# Patient Record
Sex: MALE | Race: ASIAN | NOT HISPANIC OR LATINO | Employment: PART TIME | URBAN - METROPOLITAN AREA
[De-identification: names, ages, dates, MRNs, and addresses within clinical notes are randomized per-mention and may not be internally consistent; named-entity substitution may affect disease eponyms.]

---

## 2022-07-07 ENCOUNTER — HOSPITAL ENCOUNTER (EMERGENCY)
Facility: CLINIC | Age: 27
Discharge: HOME | End: 2022-07-07
Attending: EMERGENCY MEDICINE | Admitting: EMERGENCY MEDICINE
Payer: COMMERCIAL

## 2022-07-07 VITALS
TEMPERATURE: 99.2 F | HEIGHT: 66 IN | WEIGHT: 140 LBS | SYSTOLIC BLOOD PRESSURE: 101 MMHG | RESPIRATION RATE: 16 BRPM | BODY MASS INDEX: 22.5 KG/M2 | HEART RATE: 94 BPM | OXYGEN SATURATION: 100 % | DIASTOLIC BLOOD PRESSURE: 62 MMHG

## 2022-07-07 DIAGNOSIS — K92.1 MELENA: ICD-10-CM

## 2022-07-07 DIAGNOSIS — D50.0 NORMOCYTIC ANEMIA DUE TO BLOOD LOSS: ICD-10-CM

## 2022-07-07 DIAGNOSIS — K92.2 UPPER GI BLEED: ICD-10-CM

## 2022-07-07 LAB
ALBUMIN SERPL-MCNC: 2.9 G/DL (ref 3.4–5)
ALP SERPL-CCNC: 40 U/L (ref 40–150)
ALT SERPL W P-5'-P-CCNC: 12 U/L (ref 0–70)
ANION GAP SERPL CALCULATED.3IONS-SCNC: 7 MMOL/L (ref 3–14)
AST SERPL W P-5'-P-CCNC: 16 U/L (ref 0–45)
BASOPHILS # BLD AUTO: 0 10E3/UL (ref 0–0.2)
BASOPHILS NFR BLD AUTO: 0 %
BILIRUB SERPL-MCNC: 0.2 MG/DL (ref 0.2–1.3)
BUN SERPL-MCNC: 22 MG/DL (ref 7–30)
CALCIUM SERPL-MCNC: 7.8 MG/DL (ref 8.5–10.1)
CHLORIDE BLD-SCNC: 109 MMOL/L (ref 94–109)
CO2 SERPL-SCNC: 21 MMOL/L (ref 20–32)
CREAT SERPL-MCNC: 0.49 MG/DL (ref 0.66–1.25)
EOSINOPHIL # BLD AUTO: 0.5 10E3/UL (ref 0–0.7)
EOSINOPHIL NFR BLD AUTO: 4 %
ERYTHROCYTE [DISTWIDTH] IN BLOOD BY AUTOMATED COUNT: 13.6 % (ref 10–15)
GFR SERPL CREATININE-BSD FRML MDRD: >90 ML/MIN/1.73M2
GLUCOSE BLD-MCNC: 89 MG/DL (ref 70–99)
HCT VFR BLD AUTO: 29.4 % (ref 40–53)
HEMOCCULT STL QL: POSITIVE
HGB BLD-MCNC: 9 G/DL (ref 13.3–17.7)
HOLD SPECIMEN: NORMAL
IMM GRANULOCYTES # BLD: 0.1 10E3/UL
IMM GRANULOCYTES NFR BLD: 1 %
INR PPP: 1 (ref 0.85–1.15)
LYMPHOCYTES # BLD AUTO: 2.4 10E3/UL (ref 0.8–5.3)
LYMPHOCYTES NFR BLD AUTO: 22 %
MCH RBC QN AUTO: 27.6 PG (ref 26.5–33)
MCHC RBC AUTO-ENTMCNC: 30.6 G/DL (ref 31.5–36.5)
MCV RBC AUTO: 90 FL (ref 78–100)
MONOCYTES # BLD AUTO: 0.7 10E3/UL (ref 0–1.3)
MONOCYTES NFR BLD AUTO: 7 %
NEUTROPHILS # BLD AUTO: 7.3 10E3/UL (ref 1.6–8.3)
NEUTROPHILS NFR BLD AUTO: 66 %
NRBC # BLD AUTO: 0 10E3/UL
NRBC BLD AUTO-RTO: 0 /100
PLATELET # BLD AUTO: 212 10E3/UL (ref 150–450)
POTASSIUM BLD-SCNC: 4 MMOL/L (ref 3.4–5.3)
PROT SERPL-MCNC: 6 G/DL (ref 6.8–8.8)
RBC # BLD AUTO: 3.26 10E6/UL (ref 4.4–5.9)
SODIUM SERPL-SCNC: 137 MMOL/L (ref 133–144)
WBC # BLD AUTO: 11 10E3/UL (ref 4–11)

## 2022-07-07 PROCEDURE — 96374 THER/PROPH/DIAG INJ IV PUSH: CPT

## 2022-07-07 PROCEDURE — C9113 INJ PANTOPRAZOLE SODIUM, VIA: HCPCS | Performed by: EMERGENCY MEDICINE

## 2022-07-07 PROCEDURE — 99284 EMERGENCY DEPT VISIT MOD MDM: CPT | Mod: 25

## 2022-07-07 PROCEDURE — 258N000003 HC RX IP 258 OP 636: Performed by: EMERGENCY MEDICINE

## 2022-07-07 PROCEDURE — 36415 COLL VENOUS BLD VENIPUNCTURE: CPT | Performed by: EMERGENCY MEDICINE

## 2022-07-07 PROCEDURE — 96361 HYDRATE IV INFUSION ADD-ON: CPT

## 2022-07-07 PROCEDURE — 82272 OCCULT BLD FECES 1-3 TESTS: CPT | Performed by: EMERGENCY MEDICINE

## 2022-07-07 PROCEDURE — 250N000011 HC RX IP 250 OP 636: Performed by: EMERGENCY MEDICINE

## 2022-07-07 PROCEDURE — 85610 PROTHROMBIN TIME: CPT | Performed by: EMERGENCY MEDICINE

## 2022-07-07 PROCEDURE — 85004 AUTOMATED DIFF WBC COUNT: CPT | Performed by: EMERGENCY MEDICINE

## 2022-07-07 PROCEDURE — 82310 ASSAY OF CALCIUM: CPT | Performed by: EMERGENCY MEDICINE

## 2022-07-07 PROCEDURE — 250N000013 HC RX MED GY IP 250 OP 250 PS 637: Performed by: EMERGENCY MEDICINE

## 2022-07-07 RX ORDER — ACETAMINOPHEN 500 MG
1000 TABLET ORAL ONCE
Status: COMPLETED | OUTPATIENT
Start: 2022-07-07 | End: 2022-07-07

## 2022-07-07 RX ORDER — PANTOPRAZOLE SODIUM 40 MG/1
40 TABLET, DELAYED RELEASE ORAL DAILY
Qty: 30 TABLET | Refills: 0 | Status: ON HOLD | OUTPATIENT
Start: 2022-07-07 | End: 2022-07-10

## 2022-07-07 RX ADMIN — PANTOPRAZOLE SODIUM 40 MG: 40 INJECTION, POWDER, FOR SOLUTION INTRAVENOUS at 15:09

## 2022-07-07 RX ADMIN — ACETAMINOPHEN 1000 MG: 500 TABLET, FILM COATED ORAL at 15:10

## 2022-07-07 RX ADMIN — SODIUM CHLORIDE 1000 ML: 9 INJECTION, SOLUTION INTRAVENOUS at 15:10

## 2022-07-07 ASSESSMENT — ENCOUNTER SYMPTOMS
ABDOMINAL PAIN: 0
APPETITE CHANGE: 0
ROS GI COMMENTS: + BLACK STOOL
HEADACHES: 1

## 2022-07-07 NOTE — ED PROVIDER NOTES
History   Chief Complaint:  Black stools       The history is provided by the patient.      Oh Townsend is a healthy 26 year old male who presents with black stools. He reports 2-3 days of black stools, including an episode this morning, which he read about online and became concerned. He visited Urgent Care this morning and was referred to the ER. He denies hematochezia or current abdominal pain with a single episode of epigastric pain last night lasting 1/2 hour and resolving after eating a banana. He does have mild headache but no lightheadedness or dyspnea. He denies prior history of gastrointestinal bleeds, endoscopy, excessive use of NSAIDs, tobacco use, or more than occasional alcohol use. He denies recent diet changes, iron supplements, or Pepto Bismol use.     Of note, review of medical records indicates he was seen in December 2019 for dark stools and recommended to follow up with gastroenterology. He never did this and his stools normalized after a couple weeks.    Review of Systems   Constitutional: Negative for appetite change.   Respiratory: Negative for shortness of breath.    Gastrointestinal: Negative for abdominal pain and blood in stool.        + black stool   Neurological: Positive for headaches. Negative for light-headedness.   All other systems reviewed and are negative.    Allergies:  The patient has no known allergies.     Medications:  The patient denies taking prescribed medications.   Does not regularly use NSAIDs.    Past Medical History:     Anal condyloma    Surgical History:  Excision condyloma    Social History:  The patient presents to the ED alone.  Alcohol Use: occasional  Denies tobacco use.    Physical Exam     Patient Vitals for the past 24 hrs:   BP Temp Temp src Pulse Resp SpO2 Height Weight   07/07/22 1715 -- -- -- -- -- 100 % -- --   07/07/22 1700 101/62 -- -- 94 -- 100 % -- --   07/07/22 1645 -- -- -- -- -- 100 % -- --   07/07/22 1630 113/65 -- -- 104 -- 100 % -- --  "  07/07/22 1615 -- -- -- -- -- 100 % -- --   07/07/22 1600 111/69 -- -- 99 -- 100 % -- --   07/07/22 1545 -- -- -- -- -- 100 % -- --   07/07/22 1530 108/63 -- -- 92 -- 100 % -- --   07/07/22 1515 -- -- -- -- -- 100 % -- --   07/07/22 1500 107/76 -- -- 100 -- 100 % -- --   07/07/22 1445 -- -- -- -- -- 100 % -- --   07/07/22 1430 117/78 -- -- 102 -- 100 % -- --   07/07/22 1016 129/73 99.2  F (37.3  C) Temporal 113 16 98 % 1.68 m (5' 6.14\") 63.5 kg (140 lb)       Physical Exam  General: Well-developed and well-nourished. Well appearing young  man. Cooperative.  Head:  Atraumatic.  Eyes:  Conjunctivae, lids, and sclerae are normal.  Neck:  Supple. Normal range of motion.  CV:  Mildly tachycardic rate and regular rhythm. Normal heart sounds with no murmurs, rubs, or gallops detected.  Resp:  No respiratory distress. Clear to auscultation bilaterally without decreased breath sounds, wheezing, rales, or rhonchi.  GI:  Soft. Non-distended. Non-tender.   Rectal: LORE with return of melanotic stool without gross blood. No palpable masses.   MS:  Normal ROM.   Skin:  Warm. Non-diaphoretic. No pallor.  Neuro: Awake. A&Ox3. Normal strength.  Psych:  Normal mood and affect. Normal speech.  Vitals reviewed.    Emergency Department Course     Laboratory:  Labs Ordered and Resulted from Time of ED Arrival to Time of ED Departure   OCCULT BLOOD STOOL - Abnormal       Result Value    Occult Blood Positive (*)    COMPREHENSIVE METABOLIC PANEL - Abnormal    Sodium 137      Potassium 4.0      Chloride 109      Carbon Dioxide (CO2) 21      Anion Gap 7      Urea Nitrogen 22      Creatinine 0.49 (*)     Calcium 7.8 (*)     Glucose 89      Alkaline Phosphatase 40      AST 16      ALT 12      Protein Total 6.0 (*)     Albumin 2.9 (*)     Bilirubin Total 0.2      GFR Estimate >90     CBC WITH PLATELETS AND DIFFERENTIAL - Abnormal    WBC Count 11.0      RBC Count 3.26 (*)     Hemoglobin 9.0 (*)     Hematocrit 29.4 (*)     MCV 90      MCH " 27.6      MCHC 30.6 (*)     RDW 13.6      Platelet Count 212      % Neutrophils 66      % Lymphocytes 22      % Monocytes 7      % Eosinophils 4      % Basophils 0      % Immature Granulocytes 1      NRBCs per 100 WBC 0      Absolute Neutrophils 7.3      Absolute Lymphocytes 2.4      Absolute Monocytes 0.7      Absolute Eosinophils 0.5      Absolute Basophils 0.0      Absolute Immature Granulocytes 0.1      Absolute NRBCs 0.0     INR - Normal    INR 1.00          Emergency Department Course:  Reviewed:  I reviewed nursing notes, vitals, past medical history, and Care Everywhere.    Assessments:  1500 I obtained history and examined the patient as noted above.   1626 I rechecked the patient and explained findings. I offered admission but he preferred discharge.     Interventions:  1509  Protonix IV 40 mg  1510 Tylenol 1000 mg PO   1L NS bolus IV    Disposition:  Oh was discharged home.     Impression & Plan     Medical Decision Making:  Oh is a 26 year old presenting with 2 to 3 days of melanotic stools.  He did have a single episode of epigastric pain which resolved after half an hour.  He denies any other concerns aside from a mild headache.  He appears well on exam has no abdominal tenderness to warrant abdominal imaging.  Rectal exam did reveal melanotic, Hemoccult positive stool.  He was initially mildly tachycardic although this resolved with IV fluids.  He was given IV Protonix.  Laboratory studies revealed no kidney injury or electrolyte derangements.  There is no leukocytosis, hepatitis, biliary obstruction, or abnormal INR.  However, there is normocytic anemia to 9.0.  Review of his medical records indicates hemoglobin was 8.1 in 2019 but that was also had a visit for melena.  He tells me those black stools resolved after 2 to 3 weeks and he never sought care.  Particularly because he has been lost to follow-up in the past, I offered him observation admission as he would likely benefit from endoscopy.   However, he does not want to miss work and would prefer to be discharged. This is a reasonable option as he is grossly asymptomatic, otherwise healthy, young, and hemodynamically stable at this point. I provided him information for gastroenterology for very close follow-up and we will start him on daily Protonix.  I counseled Oh he needs to return to the emergency department if he cannot arrange close follow-up, changes his mind regarding admission, or has any worsening.  We discussed specific symptoms of anemia.  All of his questions were answered and he verbalized understanding.  Prefers discharge to admission.     Diagnosis:    ICD-10-CM    1. Upper GI bleed  K92.2    2. Melena  K92.1    3. Normocytic anemia due to blood loss  D50.0        Discharge Medications:  Discharge Medication List as of 7/7/2022  5:28 PM      START taking these medications    Details   pantoprazole (PROTONIX) 40 MG EC tablet Take 1 tablet (40 mg) by mouth daily for 30 doses, Disp-30 tablet, R-0, Local Print             Scribe Disclosure:  I, Clarissa Leblanc, am serving as a scribe at 2:56 PM on 7/7/2022 to document services personally performed by Christel Hou MD based on my observations and the provider's statements to me.            Christel Hou MD  07/08/22 2273

## 2022-07-07 NOTE — DISCHARGE INSTRUCTIONS
Start taking antacid every day as prescribed with next dose tomorrow.  First thing tomorrow you need to call gastroenterologist to arrange an appointment.  You need an endoscopy to look for the cause of your bleeding.  If you cannot arrange outpatient follow-up, change your mind regarding admission, or have any worsening you need to return to the emergency department.  Concerning symptoms include, but are not limited to, lightheadedness, shortness of breath, or increased frequency of black stools.

## 2022-07-07 NOTE — LETTER
July 7, 2022      To Whom It May Concern:      Oh Townsend was seen in our Emergency Department today, 07/07/22.  He will need to make an appointment with a specialist so please allow him to leave work when this appointment is scheduled.    Sincerely,        Christel Hou MD

## 2022-07-07 NOTE — ED TRIAGE NOTES
Epigastric abdominal pain since  0200. Ate a banana and it went away. Pt at urgent care and sent here. . ABCs intact. A&OX4.      Triage Assessment     Row Name 07/07/22 1015       Triage Assessment (Adult)    Airway WDL WDL       Respiratory WDL    Respiratory WDL WDL       Skin Circulation/Temperature WDL    Skin Circulation/Temperature WDL WDL       Cardiac WDL    Cardiac WDL WDL       Peripheral/Neurovascular WDL    Peripheral Neurovascular WDL WDL       Cognitive/Neuro/Behavioral WDL    Cognitive/Neuro/Behavioral WDL WDL

## 2022-07-08 ENCOUNTER — HOSPITAL ENCOUNTER (OUTPATIENT)
Facility: CLINIC | Age: 27
Setting detail: OBSERVATION
Discharge: HOME | End: 2022-07-10
Attending: STUDENT IN AN ORGANIZED HEALTH CARE EDUCATION/TRAINING PROGRAM | Admitting: EMERGENCY MEDICINE
Payer: COMMERCIAL

## 2022-07-08 DIAGNOSIS — K92.2 GASTROINTESTINAL HEMORRHAGE, UNSPECIFIED GASTROINTESTINAL HEMORRHAGE TYPE: ICD-10-CM

## 2022-07-08 DIAGNOSIS — K92.1 MELENA: Primary | ICD-10-CM

## 2022-07-08 LAB
ABO/RH(D): NORMAL
ALBUMIN SERPL-MCNC: 3.2 G/DL (ref 3.5–5)
ALP SERPL-CCNC: 34 U/L (ref 45–120)
ALT SERPL W P-5'-P-CCNC: 13 U/L (ref 0–45)
ANION GAP SERPL CALCULATED.3IONS-SCNC: 6 MMOL/L (ref 5–18)
ANTIBODY SCREEN: NEGATIVE
AST SERPL W P-5'-P-CCNC: 14 U/L (ref 0–40)
BASOPHILS # BLD AUTO: 0 10E3/UL (ref 0–0.2)
BASOPHILS NFR BLD AUTO: 0 %
BILIRUB SERPL-MCNC: 0.1 MG/DL (ref 0–1)
BLD PROD TYP BPU: NORMAL
BLD PROD TYP BPU: NORMAL
BLOOD COMPONENT TYPE: NORMAL
BLOOD COMPONENT TYPE: NORMAL
BUN SERPL-MCNC: 16 MG/DL (ref 8–22)
CALCIUM SERPL-MCNC: 8 MG/DL (ref 8.5–10.5)
CHLORIDE BLD-SCNC: 109 MMOL/L (ref 98–107)
CO2 SERPL-SCNC: 23 MMOL/L (ref 22–31)
CODING SYSTEM: NORMAL
CODING SYSTEM: NORMAL
CREAT SERPL-MCNC: 0.68 MG/DL (ref 0.7–1.3)
CROSSMATCH: NORMAL
CROSSMATCH: NORMAL
EOSINOPHIL # BLD AUTO: 0.4 10E3/UL (ref 0–0.7)
EOSINOPHIL NFR BLD AUTO: 5 %
ERYTHROCYTE [DISTWIDTH] IN BLOOD BY AUTOMATED COUNT: 13.9 % (ref 10–15)
GFR SERPL CREATININE-BSD FRML MDRD: >90 ML/MIN/1.73M2
GLUCOSE BLD-MCNC: 81 MG/DL (ref 70–125)
HCT VFR BLD AUTO: 21.2 % (ref 40–53)
HGB BLD-MCNC: 6.9 G/DL (ref 13.3–17.7)
HGB BLD-MCNC: 7.3 G/DL (ref 13.3–17.7)
IMM GRANULOCYTES # BLD: 0.1 10E3/UL
IMM GRANULOCYTES NFR BLD: 1 %
ISSUE DATE AND TIME: NORMAL
ISSUE DATE AND TIME: NORMAL
LYMPHOCYTES # BLD AUTO: 1.9 10E3/UL (ref 0.8–5.3)
LYMPHOCYTES NFR BLD AUTO: 20 %
MCH RBC QN AUTO: 28 PG (ref 26.5–33)
MCHC RBC AUTO-ENTMCNC: 32.5 G/DL (ref 31.5–36.5)
MCV RBC AUTO: 86 FL (ref 78–100)
MONOCYTES # BLD AUTO: 0.8 10E3/UL (ref 0–1.3)
MONOCYTES NFR BLD AUTO: 8 %
NEUTROPHILS # BLD AUTO: 6.2 10E3/UL (ref 1.6–8.3)
NEUTROPHILS NFR BLD AUTO: 66 %
NRBC # BLD AUTO: 0 10E3/UL
NRBC BLD AUTO-RTO: 0 /100
PLATELET # BLD AUTO: 186 10E3/UL (ref 150–450)
POTASSIUM BLD-SCNC: 3.9 MMOL/L (ref 3.5–5)
PROT SERPL-MCNC: 5.7 G/DL (ref 6–8)
RBC # BLD AUTO: 2.46 10E6/UL (ref 4.4–5.9)
SARS-COV-2 RNA RESP QL NAA+PROBE: NEGATIVE
SODIUM SERPL-SCNC: 138 MMOL/L (ref 136–145)
SPECIMEN EXPIRATION DATE: NORMAL
UNIT ABO/RH: NORMAL
UNIT ABO/RH: NORMAL
UNIT NUMBER: NORMAL
UNIT NUMBER: NORMAL
UNIT STATUS: NORMAL
UNIT STATUS: NORMAL
UNIT TYPE ISBT: 5100
UNIT TYPE ISBT: 5100
WBC # BLD AUTO: 9.3 10E3/UL (ref 4–11)

## 2022-07-08 PROCEDURE — 80053 COMPREHEN METABOLIC PANEL: CPT | Performed by: EMERGENCY MEDICINE

## 2022-07-08 PROCEDURE — 85025 COMPLETE CBC W/AUTO DIFF WBC: CPT | Performed by: EMERGENCY MEDICINE

## 2022-07-08 PROCEDURE — 258N000003 HC RX IP 258 OP 636: Performed by: STUDENT IN AN ORGANIZED HEALTH CARE EDUCATION/TRAINING PROGRAM

## 2022-07-08 PROCEDURE — 258N000002 HC RX IP 258 OP 250: Performed by: EMERGENCY MEDICINE

## 2022-07-08 PROCEDURE — G0378 HOSPITAL OBSERVATION PER HR: HCPCS

## 2022-07-08 PROCEDURE — 86923 COMPATIBILITY TEST ELECTRIC: CPT | Performed by: STUDENT IN AN ORGANIZED HEALTH CARE EDUCATION/TRAINING PROGRAM

## 2022-07-08 PROCEDURE — U0003 INFECTIOUS AGENT DETECTION BY NUCLEIC ACID (DNA OR RNA); SEVERE ACUTE RESPIRATORY SYNDROME CORONAVIRUS 2 (SARS-COV-2) (CORONAVIRUS DISEASE [COVID-19]), AMPLIFIED PROBE TECHNIQUE, MAKING USE OF HIGH THROUGHPUT TECHNOLOGIES AS DESCRIBED BY CMS-2020-01-R: HCPCS | Performed by: STUDENT IN AN ORGANIZED HEALTH CARE EDUCATION/TRAINING PROGRAM

## 2022-07-08 PROCEDURE — 36430 TRANSFUSION BLD/BLD COMPNT: CPT

## 2022-07-08 PROCEDURE — C9113 INJ PANTOPRAZOLE SODIUM, VIA: HCPCS | Performed by: EMERGENCY MEDICINE

## 2022-07-08 PROCEDURE — 96376 TX/PRO/DX INJ SAME DRUG ADON: CPT

## 2022-07-08 PROCEDURE — P9016 RBC LEUKOCYTES REDUCED: HCPCS | Performed by: STUDENT IN AN ORGANIZED HEALTH CARE EDUCATION/TRAINING PROGRAM

## 2022-07-08 PROCEDURE — 250N000011 HC RX IP 250 OP 636: Performed by: STUDENT IN AN ORGANIZED HEALTH CARE EDUCATION/TRAINING PROGRAM

## 2022-07-08 PROCEDURE — 85018 HEMOGLOBIN: CPT | Performed by: EMERGENCY MEDICINE

## 2022-07-08 PROCEDURE — 99219 PR INITIAL OBSERVATION CARE,LEVEL II: CPT | Performed by: EMERGENCY MEDICINE

## 2022-07-08 PROCEDURE — C9113 INJ PANTOPRAZOLE SODIUM, VIA: HCPCS | Performed by: STUDENT IN AN ORGANIZED HEALTH CARE EDUCATION/TRAINING PROGRAM

## 2022-07-08 PROCEDURE — 86850 RBC ANTIBODY SCREEN: CPT | Performed by: EMERGENCY MEDICINE

## 2022-07-08 PROCEDURE — 36415 COLL VENOUS BLD VENIPUNCTURE: CPT | Performed by: EMERGENCY MEDICINE

## 2022-07-08 PROCEDURE — 250N000011 HC RX IP 250 OP 636: Performed by: EMERGENCY MEDICINE

## 2022-07-08 PROCEDURE — 96374 THER/PROPH/DIAG INJ IV PUSH: CPT

## 2022-07-08 PROCEDURE — 258N000003 HC RX IP 258 OP 636: Performed by: EMERGENCY MEDICINE

## 2022-07-08 PROCEDURE — 99285 EMERGENCY DEPT VISIT HI MDM: CPT | Mod: CS,25

## 2022-07-08 PROCEDURE — 96361 HYDRATE IV INFUSION ADD-ON: CPT

## 2022-07-08 PROCEDURE — C9803 HOPD COVID-19 SPEC COLLECT: HCPCS

## 2022-07-08 RX ORDER — SODIUM CHLORIDE 450 MG/100ML
INJECTION, SOLUTION INTRAVENOUS CONTINUOUS
Status: DISCONTINUED | OUTPATIENT
Start: 2022-07-08 | End: 2022-07-09

## 2022-07-08 RX ORDER — ACETAMINOPHEN 500 MG
500-1000 TABLET ORAL EVERY 6 HOURS PRN
COMMUNITY

## 2022-07-08 RX ADMIN — PANTOPRAZOLE SODIUM 8 MG/HR: 40 INJECTION, POWDER, FOR SOLUTION INTRAVENOUS at 20:43

## 2022-07-08 RX ADMIN — SODIUM CHLORIDE: 4.5 INJECTION, SOLUTION INTRAVENOUS at 17:58

## 2022-07-08 RX ADMIN — PANTOPRAZOLE SODIUM 80 MG: 40 INJECTION, POWDER, FOR SOLUTION INTRAVENOUS at 15:14

## 2022-07-08 RX ADMIN — SODIUM CHLORIDE 1000 ML: 9 INJECTION, SOLUTION INTRAVENOUS at 15:14

## 2022-07-08 ASSESSMENT — ENCOUNTER SYMPTOMS
DIZZINESS: 1
FEVER: 0
PALPITATIONS: 1
APPETITE CHANGE: 0
SHORTNESS OF BREATH: 0
BLOOD IN STOOL: 0
LIGHT-HEADEDNESS: 0
HEADACHES: 1
ABDOMINAL PAIN: 0
SHORTNESS OF BREATH: 1
BLOOD IN STOOL: 1

## 2022-07-08 NOTE — ED PROVIDER NOTES
EMERGENCY DEPARTMENT ENCOUNTER      NAME: Oh Townsend  AGE: 26 year old male  YOB: 1995  MRN: [id number]  EVALUATION DATE & TIME: 7/8/2022  2:47 PM    PCP: No Ref-Primary, Physician    ED PROVIDER: Harris Bernal M.D.      Chief Complaint   Patient presents with     Shortness of Breath         FINAL IMPRESSION:  1. Melena    2. Gastrointestinal hemorrhage, unspecified gastrointestinal hemorrhage type          ED COURSE & MEDICAL DECISION MAKING:    Pertinent Labs & Imaging studies reviewed. (See chart for details)  26 year old male presents to the Emergency Department for evaluation of anemia and lightheadedness and GI bleed.  Patient presented with melanotic continued after evaluation yesterday.  Hemoglobin had dropped below 7 he was pale and mildly tachycardic and symptomatic.  IV was placed and patient will be transfused 2 units of packed red cells and admitted to the hospital for further care.  Have also started pantoprazole.    2:56 PM I met with the patient, obtained history, performed an initial exam, and discussed options and plan for diagnostics and treatment here in the ED.    At the conclusion of the encounter I discussed the results of all of the tests and the disposition. The questions were answered. The patient or family acknowledged understanding and was agreeable with the care plan.       30 minutes of critical care time     MEDICATIONS GIVEN IN THE EMERGENCY:  Medications   0.9% sodium chloride BOLUS (0 mLs Intravenous Stopped 7/8/22 1615)   pantoprazole (PROTONIX) IV push injection 80 mg (80 mg Intravenous Given 7/8/22 1514)       NEW PRESCRIPTIONS STARTED AT TODAY'S ER VISIT  Discharge Medication List as of 7/10/2022 10:24 AM             =================================================================    HPI    Patient information was obtained from: Patient    Use of : N/A       Oh Townsend is a 26 year old male with no pertinent history who presents to this ED via  walk-in for evaluation of shortness of breath.    Patient reports he was seen yesterday at [facility] and diagnose with an upper GI bleed. He states that he was discharged home at that time and was told to come in if he developed shortness of breath, headache, or dizziness. Patient reports that after he went home yesterday, he was feeling ok aside from his dark stools. Today, patient developed a headache and notes dizziness, palpitations, and mild shortness of breath aside from his ongoing black stools. He states that he has a headache with standing up. Patient reports a history of this in the past. Otherwise, he denies any fever, appetite changes, and abdominal pain. Patient denies any significant medical history and daily medications. Patient does not have a PCP. No other complaints at this time.    Per chart review, patient was seen at [facility] Emergency Department on 7/7 for upper GI bleed. Rectal exam did reveal melanotic, Hemoccult positive stool.  He was initially mildly tachycardic although this resolved with IV fluids.  He was given IV Protonix.  Laboratory studies revealed no kidney injury or electrolyte derangements.  There is no leukocytosis, hepatitis, biliary obstruction, or abnormal INR. However, there is normocytic anemia to 9.0. Particularly because he has been lost to follow-up in the past, I offered him observation admission as he would likely benefit from endoscopy.  However, he does not want to miss work and would prefer to be discharged. Patient discharged home and given return precautions.    REVIEW OF SYSTEMS   Review of Systems   Constitutional: Negative for appetite change and fever.   Respiratory: Positive for shortness of breath.    Cardiovascular: Positive for palpitations.   Gastrointestinal: Positive for blood in stool. Negative for abdominal pain.   Neurological: Positive for dizziness and headaches.   All other systems reviewed and are negative.     PAST MEDICAL HISTORY:  History reviewed.  No pertinent past medical history.    PAST SURGICAL HISTORY:  Past Surgical History:   Procedure Laterality Date     ESOPHAGOSCOPY, GASTROSCOPY, DUODENOSCOPY (EGD), COMBINED N/A 7/9/2022    Procedure: ESOPHAGOGASTRODUODENOSCOPY (EGD) with biopsies;  Surgeon: David Bains MD;  Location: [facility] OR           CURRENT MEDICATIONS:    acetaminophen (TYLENOL) 500 MG tablet  pantoprazole (PROTONIX) 40 MG EC tablet        ALLERGIES:  No Known Allergies    FAMILY HISTORY:  History reviewed. No pertinent family history.    SOCIAL HISTORY:   Social History     Socioeconomic History     Marital status: Single       VITALS:  /60 (BP Location: Right arm)   Pulse 88   Temp 98.1  F (36.7  C) (Oral)   Resp 16   Wt 62.7 kg (138 lb 4.8 oz)   SpO2 100%   BMI 22.23 kg/m        PHYSICAL EXAM    Constitutional: Well developed, Well nourished, NAD, GCS 15  HENT: Normocephalic, Atraumatic, Bilateral external ears normal, Oropharynx normal, mucous membranes moist, Nose normal. Neck-  Normal range of motion, No tenderness, Supple, No stridor.  Eyes: PERRL, EOMI, Conjunctiva normal, No discharge.   Respiratory: Normal breath sounds, No respiratory distress, No wheezing, Speaks full sentences easily. No cough.  Cardiovascular: Tachycardic, Regular rhythm, No murmurs, No rubs, No gallops. Chest wall nontender.  GI:Soft, No tenderness, No masses, No flank tenderness. No rebound or guarding.   Musculoskeletal: 2+ DP pulses. No edema.No cyanosis, No clubbing. Good range of motion in all major joints. No tenderness to palpation or major deformities noted.   Integument: Warm, Dry, No erythema, No rash. No petechiae. Pale.   Neurologic: Alert & oriented x 3,  CN 3-12 intact Normal motor function, Normal sensory function, No focal deficits noted. Normal gait. Normal finger to nose bilaterally  Psychiatric: Affect normal, Judgment normal, Mood normal. Cooperative.          LAB:  All pertinent labs reviewed and  interpreted.  Labs Ordered and Resulted from Time of ED Arrival to Time of ED Departure   COMPREHENSIVE METABOLIC PANEL - Abnormal       Result Value    Sodium 138      Potassium 3.9      Chloride 109 (*)     Carbon Dioxide (CO2) 23      Anion Gap 6      Urea Nitrogen 16      Creatinine 0.68 (*)     Calcium 8.0 (*)     Glucose 81      Alkaline Phosphatase 34 (*)     AST 14      ALT 13      Protein Total 5.7 (*)     Albumin 3.2 (*)     Bilirubin Total 0.1      GFR Estimate >90     CBC WITH PLATELETS AND DIFFERENTIAL - Abnormal    WBC Count 9.3      RBC Count 2.46 (*)     Hemoglobin 6.9 (*)     Hematocrit 21.2 (*)     MCV 86      MCH 28.0      MCHC 32.5      RDW 13.9      Platelet Count 186      % Neutrophils 66      % Lymphocytes 20      % Monocytes 8      % Eosinophils 5      % Basophils 0      % Immature Granulocytes 1      NRBCs per 100 WBC 0      Absolute Neutrophils 6.2      Absolute Lymphocytes 1.9      Absolute Monocytes 0.8      Absolute Eosinophils 0.4      Absolute Basophils 0.0      Absolute Immature Granulocytes 0.1      Absolute NRBCs 0.0     HEMOGLOBIN - Abnormal    Hemoglobin 7.3 (*)    HEMOGLOBIN - Abnormal    Hemoglobin 9.1 (*)    HEMOGLOBIN - Abnormal    Hemoglobin 10.2 (*)    COVID-19 VIRUS (CORONAVIRUS) BY PCR - Normal    SARS CoV2 PCR Negative     TYPE AND SCREEN, ADULT    ABO/RH(D) O POS      Antibody Screen Negative      SPECIMEN EXPIRATION DATE [id number]     PREPARE RED BLOOD CELLS (UNIT)    CROSSMATCH Compatible      UNIT ABO/RH O Pos      Unit Number [id number]      Unit Status Transfused      Blood Component Type Red Blood Cells      Product Code X0319V43      CODING SYSTEM THXH823      UNIT TYPE ISBT 5100      ISSUE DATE AND TIME [id number]     PREPARE RED BLOOD CELLS (UNIT)    CROSSMATCH Compatible      UNIT ABO/RH O Pos      Unit Number [id number]      Unit Status Transfused      Blood Component Type Red Blood Cells      Product Code D0250B41      CODING SYSTEM FFSJ231       UNIT TYPE ISBT 5100      ISSUE DATE AND TIME [id number]     SURGICAL PATHOLOGY EXAM   TRANSFUSE RED BLOOD CELLS (UNIT)   TRANSFUSE RED BLOOD CELLS (UNIT)   ABO/RH TYPE AND SCREEN       RADIOLOGY:  Reviewed all pertinent imaging. Please see official radiology report.  No orders to display           I, Tsering Tao, am serving as a scribe to document services personally performed by Dr. Harris Bernal based on my observation and the provider's statements to me. I, Harris Bernal MD attest that Tsering Tao is acting in a scribe capacity, has observed my performance of the services and has documented them in accordance with my direction.    Harris Bernal M.D.  Emergency Medicine  [facility] EMERGENCY ROOM  [address] [address]  [phone]  Dept: [phone]     Harris Bernal MD  07/11/22 1407

## 2022-07-08 NOTE — ED TRIAGE NOTES
Patient is here with shortness of breath, headache, black stool, dizziness. He was at [facility] yesterday note below:    Oh Townsend is a 26 year old male with history of anal condyloma who presents with black stools for the past 2-3 days. He explains he was seen at Urgent Care this morning and sent here for further evaluation. He reports every bowel movement for the past few days is black with no visualized blood in stool. Also complains of mild headache. He reports eating with no pain this morning. Notes his last bowel movement was black this morning. He also complains of one episode of upper abdominal pain last night lasting half an hour. He explains that he has no history of gastrointestinal bleeds or scopes and denies excessive use of pain medication or tobacco, but notes occasional alcohol use. Denies recent diet changes, iron supplements, or Pepto Bismol use.

## 2022-07-08 NOTE — PHARMACY-ADMISSION MEDICATION HISTORY
Pharmacy Note - Admission Medication History    Pertinent Provider Information: none     ______________________________________________________________________    Prior To Admission (PTA) med list completed and updated in EMR.       PTA Med List   Medication Sig Last Dose     acetaminophen (TYLENOL) 500 MG tablet Take 500-1,000 mg by mouth every 6 hours as needed for mild pain 7/8/2022 at 1000     pantoprazole (PROTONIX) 40 MG EC tablet Take 1 tablet (40 mg) by mouth daily for 30 doses 7/8/2022 at 1000       Information source(s): Patient and CareEverywhere/SureScripts  Method of interview communication: in-person    Summary of Changes to PTA Med List  New: Tylenol  Discontinued: none  Changed: none    Patient was asked about OTC/herbal products specifically.  PTA med list reflects this.    In the past week, patient estimated taking medication this percent of the time:  greater than 90%.    Allergies were reviewed, assessed, and updated with the patient.      Patient does not use any multi-dose medications prior to admission.    The information provided in this note is only as accurate as the sources available at the time of the update(s).    Thank you for the opportunity to participate in the care of this patient.    Debi Salinas RPH  7/8/2022 3:20 PM

## 2022-07-08 NOTE — H&P
[facility] MEDICINE ADMISSION HISTORY AND PHYSICAL     Assessment & Plan       WALTER is a 26 year old male into the [facility] due to melena.  Pt grew up in [place].  He  Is an otherwise healthy male who complains of about 1 week of melena without bright  Red blood per rectum. Pt had a similar episode back on 2019 that lasted 1 week  Though have never had GI follow up.    He was seen in the urgent care yesterday.  The hemoglobin was 9.  He was offered  Admission for a GI evaluation but he did not wish to miss work and thus declined. He  Returns today due to ongoing symptoms.      Pt complains of occasional epigastric burning but not daily.  Denies regular NSAID or  Alcohol use. Denies H pylori testing.    ER work up confirms ongoing anemia with a hemoglobin today of 6.9. (down from 9.0  Yesterday).   Hemodynamics on arrival show a pulse of 113 and a blood pressure of  129/73.      Problem list:    1.  Melena/UI bleed: case discussed with Dr Gómez (covering GI); keep NPO, PPI drip     2.  Anemia, iron deficient:  2 units pRBC's are being transfused; reassess;       DVTP: Low Risk Patient   Code Status: Full Code  Disposition: Observation   Expected LOS: 1 day  Goals for the hospitalization: diagnostics, hemodynamic support     The patient's care was discussed with the Bedside Nurse.  Chief Complaint  melena     HISTORY     26 year old male into the [facility] for a second er visit in 24 hours.  Pt is otherwise  Healthy. He endorses issues with melena over the last 5 days.  He has some epigastric  Burning at times.  Denies fevers, chills, weight loss or bright red blood per rectum.   As stated above yesterday he was seen and had a hemoglobin of 9.  Pt has persistent  Symptoms.      Past Medical History     Condyloma Acuminata    Surgical History   Anal condyloma removal    Family History    Reviewed, and History reviewed. No pertinent family history.     Social History        Lives alone, works at [employer],  [facility] is nearby; no tobacco, history of MSM  Allergies   No Known Allergies  Prior to Admission Medications      Prior to Admission Medications   Prescriptions Last Dose Informant Patient Reported? Taking?   acetaminophen (TYLENOL) 500 MG tablet 7/8/2022 at 1000  Yes Yes   Sig: Take 500-1,000 mg by mouth every 6 hours as needed for mild pain   pantoprazole (PROTONIX) 40 MG EC tablet 7/8/2022 at 1000  No Yes   Sig: Take 1 tablet (40 mg) by mouth daily for 30 doses      Facility-Administered Medications: None      Review of Systems     A 12 point comprehensive review of systems was negative except as noted above in HPI.    PHYSICAL EXAMINATION       Vitals      Temp:  [97.4  F (36.3  C)-99.3  F (37.4  C)] 98.8  F (37.1  C)  Pulse:  [] 103  Resp:  [16-18] 18  BP: ()/(52-60) 91/57  SpO2:  [100 %] 100 %    Examination     GENERAL:  Alert, appears comfortable, in no acute distress, appears stated age   HEAD:  Normocephalic, without obvious abnormality, atraumatic   EYES:  PERRL, conjunctiva/corneas clear, no scleral icterus, EOM's intact   NOSE: Nares normal, septum midline, mucosa normal, no drainage   THROAT: Lips, mucosa, and tongue normal; teeth and gums normal, mouth moist   NECK: Supple, symmetrical, trachea midline   BACK:   Symmetric, no curvature, ROM normal   LUNGS:   Clear to auscultation bilaterally, no rales, rhonchi, or wheezing, symmetric chest rise on inhalation, respirations unlabored   CHEST WALL:  No tenderness or deformity   HEART:  Regular rate and rhythm, S1 and S2 normal, no murmur, rub, or gallop    ABDOMEN:   Soft, non-tender, bowel sounds active all four quadrants, no masses, no organomegaly, no rebound or guarding; rectal exam deferred   EXTREMITIES: Extremities normal, atraumatic, no cyanosis or edema    SKIN: Dry to touch, no exanthems in the visualized areas   NEURO: Alert, oriented x 4, moves all four extremities freely, non-focal   PSYCH: Cooperative, behavior is appropriate       Pertinent Lab     Most Recent 3 CBC's:Recent Labs   Lab Test 07/08/22  1503 07/07/22  1439   WBC 9.3 11.0   HGB 6.9* 9.0*   MCV 86 90    212               Advance Care Planning        Destiny Burnham MD  [facility]   Phone: #[phone]

## 2022-07-09 ENCOUNTER — ANESTHESIA (OUTPATIENT)
Dept: SURGERY | Facility: CLINIC | Age: 27
End: 2022-07-09
Payer: COMMERCIAL

## 2022-07-09 ENCOUNTER — ANESTHESIA EVENT (OUTPATIENT)
Dept: SURGERY | Facility: CLINIC | Age: 27
End: 2022-07-09
Payer: COMMERCIAL

## 2022-07-09 LAB
HGB BLD-MCNC: 10.2 G/DL (ref 13.3–17.7)
HGB BLD-MCNC: 9.1 G/DL (ref 13.3–17.7)
UPPER GI ENDOSCOPY: NORMAL

## 2022-07-09 PROCEDURE — 250N000011 HC RX IP 250 OP 636: Performed by: INTERNAL MEDICINE

## 2022-07-09 PROCEDURE — 88342 IMHCHEM/IMCYTCHM 1ST ANTB: CPT | Mod: 26 | Performed by: PATHOLOGY

## 2022-07-09 PROCEDURE — 250N000011 HC RX IP 250 OP 636: Performed by: EMERGENCY MEDICINE

## 2022-07-09 PROCEDURE — 96361 HYDRATE IV INFUSION ADD-ON: CPT

## 2022-07-09 PROCEDURE — G0378 HOSPITAL OBSERVATION PER HR: HCPCS

## 2022-07-09 PROCEDURE — 370N000017 HC ANESTHESIA TECHNICAL FEE, PER MIN: Performed by: INTERNAL MEDICINE

## 2022-07-09 PROCEDURE — C9113 INJ PANTOPRAZOLE SODIUM, VIA: HCPCS | Performed by: EMERGENCY MEDICINE

## 2022-07-09 PROCEDURE — 88305 TISSUE EXAM BY PATHOLOGIST: CPT | Mod: 26 | Performed by: PATHOLOGY

## 2022-07-09 PROCEDURE — 88305 TISSUE EXAM BY PATHOLOGIST: CPT | Mod: TC | Performed by: INTERNAL MEDICINE

## 2022-07-09 PROCEDURE — 272N000001 HC OR GENERAL SUPPLY STERILE: Performed by: INTERNAL MEDICINE

## 2022-07-09 PROCEDURE — 36415 COLL VENOUS BLD VENIPUNCTURE: CPT | Performed by: EMERGENCY MEDICINE

## 2022-07-09 PROCEDURE — 258N000003 HC RX IP 258 OP 636: Performed by: EMERGENCY MEDICINE

## 2022-07-09 PROCEDURE — 258N000002 HC RX IP 258 OP 250: Performed by: EMERGENCY MEDICINE

## 2022-07-09 PROCEDURE — 85018 HEMOGLOBIN: CPT | Performed by: EMERGENCY MEDICINE

## 2022-07-09 PROCEDURE — 258N000003 HC RX IP 258 OP 636: Performed by: INTERNAL MEDICINE

## 2022-07-09 PROCEDURE — C9113 INJ PANTOPRAZOLE SODIUM, VIA: HCPCS | Performed by: INTERNAL MEDICINE

## 2022-07-09 PROCEDURE — 96376 TX/PRO/DX INJ SAME DRUG ADON: CPT

## 2022-07-09 PROCEDURE — 250N000011 HC RX IP 250 OP 636: Performed by: ANESTHESIOLOGY

## 2022-07-09 PROCEDURE — 360N000075 HC SURGERY LEVEL 2, PER MIN: Performed by: INTERNAL MEDICINE

## 2022-07-09 RX ORDER — PROPOFOL 10 MG/ML
INJECTION, EMULSION INTRAVENOUS PRN
Status: DISCONTINUED | OUTPATIENT
Start: 2022-07-09 | End: 2022-07-09

## 2022-07-09 RX ORDER — LIDOCAINE 40 MG/G
CREAM TOPICAL
Status: DISCONTINUED | OUTPATIENT
Start: 2022-07-09 | End: 2022-07-10 | Stop reason: HOSPADM

## 2022-07-09 RX ORDER — PROPOFOL 10 MG/ML
INJECTION, EMULSION INTRAVENOUS CONTINUOUS PRN
Status: DISCONTINUED | OUTPATIENT
Start: 2022-07-09 | End: 2022-07-09

## 2022-07-09 RX ORDER — PANTOPRAZOLE SODIUM 20 MG/1
40 TABLET, DELAYED RELEASE ORAL
Status: DISCONTINUED | OUTPATIENT
Start: 2022-07-10 | End: 2022-07-10 | Stop reason: HOSPADM

## 2022-07-09 RX ADMIN — PANTOPRAZOLE SODIUM 8 MG/HR: 40 INJECTION, POWDER, FOR SOLUTION INTRAVENOUS at 23:40

## 2022-07-09 RX ADMIN — PROPOFOL 100 MCG/KG/MIN: 10 INJECTION, EMULSION INTRAVENOUS at 12:47

## 2022-07-09 RX ADMIN — PROPOFOL 30 MG: 10 INJECTION, EMULSION INTRAVENOUS at 12:48

## 2022-07-09 RX ADMIN — SODIUM CHLORIDE: 4.5 INJECTION, SOLUTION INTRAVENOUS at 04:47

## 2022-07-09 RX ADMIN — PROPOFOL 70 MG: 10 INJECTION, EMULSION INTRAVENOUS at 12:46

## 2022-07-09 RX ADMIN — PANTOPRAZOLE SODIUM 8 MG/HR: 40 INJECTION, POWDER, FOR SOLUTION INTRAVENOUS at 04:47

## 2022-07-09 RX ADMIN — PANTOPRAZOLE SODIUM 8 MG/HR: 40 INJECTION, POWDER, FOR SOLUTION INTRAVENOUS at 16:01

## 2022-07-09 RX ADMIN — PROPOFOL 30 MG: 10 INJECTION, EMULSION INTRAVENOUS at 12:50

## 2022-07-09 NOTE — CONSULTS
[facility]ULT[facility]    Oh Townsend   [address]  [facility] [address]  26 year old male  Admission Date/Time: 7/8/2022  2:47 PM    Primary Care Provider:  Christine Ref-Primary, Physician    Requesting Physician: Rachid Osborn MD      REASON FOR THE CONSULT:  melena    HPI:     Oh Townsend is a 26 year old male into the  ER due to melena. He ss an otherwise healthy male who complains of about 1 week of melena. Pt had a similar episode back on 2019 but was not evaluated.     He was seen in the urgent care yesterday. The hemoglobin was 9.  He was offered  advised admission for GI evaluation but declined because he did not wish to miss work  He  returns today due to ongoing symptoms.       He reports of occasional epigastric burning but denies abdominal pain, nausea, vomiting, hematemesis or rectal bleeding. Denies regular NSAID or alcohol use.      Labs at  ED revealed a hemoglobin of 6.9. (down from 9.0 yesterday).  He had a pulse of 113 and a blood pressure of 129/73. He received 2 units and Hgb is up to 9.1 this AM.    REVIEW OF SYSTEMS: 13 point review of systems is negative except that noted above.    PAST MEDICAL HISTORY: History reviewed. No pertinent past medical history.    PAST SURGICAL HISTORY: History reviewed. No pertinent surgical history.    FAMILY HISTORY: History reviewed. No pertinent family history.    SOCIAL HISTORY:   Social History     Tobacco Use     Smoking status: Not on file     Smokeless tobacco: Not on file   Substance Use Topics     Alcohol use: Not on file        MEDS:  (Not in a hospital admission)      ALLERGIES/SENSITIVITIES: Patient has no known allergies.    MEDICATIONS:  Current Outpatient Medications   Medication Sig Dispense Refill     acetaminophen (TYLENOL) 500 MG tablet Take 500-1,000 mg by mouth every 6 hours as needed for mild pain       pantoprazole (PROTONIX) 40 MG EC tablet Take 1 tablet (40 mg) by mouth daily for 30 doses 30 tablet 0       PHYSICAL  EXAM:  Temp:  [97.4  F (36.3  C)-99.3  F (37.4  C)] 98.6  F (37  C)  Pulse:  [] 69  Resp:  [13-18] 13  BP: ()/(51-67) 92/51  SpO2:  [99 %-100 %] 99 %  Body mass index is 22.5 kg/m .  GEN: Well developed, well nourished 26 year old in no acute distress.  HEENT: sclera anicteric, moist mucous membranes.   LYMPH: No cervical lymphadenopathy  PULM: Nonlabored breathing. Breath sounds equal.   CARDIO: Regular rate  GI: Non-distended. Soft.  Non-tender to palpation.  No guarding. No rebound tenderness.  EXT: warm, no lower extremity edema  NEURO: Alert. No focal defects.   PSYCH: Mental status appropriate, mood and affect normal.    SKIN: No rashes  MSK: No joint abnormalities    LABORATORY DATA:  CBC RESULTS:   Recent Labs   Lab Test 07/09/22  0706 07/08/22  1840 07/08/22  1503   WBC  --   --  9.3   RBC  --   --  2.46*   HGB 9.1*   < > 6.9*   HCT  --   --  21.2*   MCV  --   --  86   MCH  --   --  28.0   MCHC  --   --  32.5   RDW  --   --  13.9   PLT  --   --  186    < > = values in this interval not displayed.        CMP Results:   Recent Labs   Lab Test 07/08/22  1503      POTASSIUM 3.9   CHLORIDE 109*   CO2 23   ANIONGAP 6   GLC 81   BUN 16   CR 0.68*   BILITOTAL 0.1   ALKPHOS 34*   ALT 13   AST 14        INR Results:   Recent Labs   Lab Test 07/07/22  1439   INR 1.00            ASSESSMENT:   26-year-old otherwise healthy male with melena for about a week. Hgb was at 6.9 at the ED with normal BP but tachycardic. This is likely an upper GI bleed from PUD. He is hemodynamically stable after receiving 2 units of pRBCs. Hgb is 9.1 this AM.    PLAN:  1. Continue IV PPI gtt  2. EGD               David Bains MD  Thank you for the opportunity to participate in the care of this patient.   Please feel free to call me with any questions or concerns.  Phone number [phone].            CC: [facility], No Ref-Primary, Physician

## 2022-07-09 NOTE — PROGRESS NOTES
[facility] MEDICINE PROGRESS NOTE      WALTER is a 26 year old male into the [facility] due to melena.  Pt grew up in [place].  He  Is an otherwise healthy male who complains of about 1 week of melena without bright  Red blood per rectum. Pt had a similar episode back on 2019 that lasted 1 week  Though have never had GI follow up.     He was seen in the urgent care yesterday.  The hemoglobin was 9.  He was offered  Admission for a GI evaluation but he did not wish to miss work and thus declined. He  Returns today due to ongoing symptoms.       Pt complains of occasional epigastric burning but not daily.  Denies regular NSAID or  Alcohol use. Denies H pylori testing.     ER work up confirms ongoing anemia with a hemoglobin today of 6.9. (down from 9.0).   Hemodynamics on arrival show a pulse of 113 and a blood pressure of  129/73.    GI saw patient and recommended EGD. Continue with Protonix GTT     Problem list:     1.  Melena/UI bleed: keep NPO, PPI drip, GI to perform EGD                2.  Anemia, iron deficient:  2 units pRBC's are being transfused        DVTP: Low Risk Patient & active bleeding.   Code Status: Full Code  Disposition: Observation   Expected LOS: 1 day  Goals for the hospitalization: diagnostics, hemodynamic support      Rachid Osborn MD  Hospitalist  [facility]  [facility]  Phone: #[phone]    Interval History/Subjective:    NAEON. Patient asymptomatic    Hgb stable    Physical Exam/Objective:  Temp:  [97.4  F (36.3  C)-99.3  F (37.4  C)] 98.6  F (37  C)  Pulse:  [] 86  Resp:  [13-18] 17  BP: ()/(51-67) 106/58  SpO2:  [99 %-100 %] 100 %  Body mass index is 22.5 kg/m .    General: Aox3, NIAD  Cardiovascular: Regular rhythm, normal rate, normal S1, normal S2, no jugular venous distention and no lower extremity edema  Resp: Clear to auscultation bilaterally, no wheezes, no rales or rhonchi  Abd: Soft, epigastric tenderness,  non-distended, no organomegaly  Neuro: CN 2-12 intact, no focal deficits   Psych: Normal mood     Data reviewed today: I personally reviewed all new medications, labs, imaging/diagnostics reports over the past 24 hours. Pertinent findings include:    Imaging:   No results found for this or any previous visit (from the past 24 hour(s)).    Labs:  Most Recent 3 CBC's:Recent Labs   Lab Test 07/09/22  0706 07/08/22  1840 07/08/22  1503 07/07/22  1439   WBC  --   --  9.3 11.0   HGB 9.1* 7.3* 6.9* 9.0*   MCV  --   --  86 90   PLT  --   --  186 212     Most Recent 3 BMP's:Recent Labs   Lab Test 07/08/22  1503 07/07/22  1439    137   POTASSIUM 3.9 4.0   CHLORIDE 109* 109   CO2 23 21   BUN 16 22   CR 0.68* 0.49*   ANIONGAP 6 7   CHILO 8.0* 7.8*   GLC 81 89     Most Recent 2 LFT's:Recent Labs   Lab Test 07/08/22  1503 07/07/22  1439   AST 14 16   ALT 13 12   ALKPHOS 34* 40   BILITOTAL 0.1 0.2     Most Recent 3 INR's:Recent Labs   Lab Test 07/07/22  1439   INR 1.00       Medications:   Personally Reviewed.  Medications     NaCl 100 mL/hr at 07/09/22 1003     pantoprazole (PROTONIX) infusion ADULT/PEDS GREATER than or EQUAL to 45 kg 8 mg/hr (07/09/22 1003)

## 2022-07-09 NOTE — ANESTHESIA PREPROCEDURE EVALUATION
Anesthesia Pre-Procedure Evaluation    Patient: Oh Townsend   MRN: [id number] : 1995        Procedure : Procedure(s):  ESOPHAGOGASTRODUODENOSCOPY (EGD) with biopsies          History reviewed. No pertinent past medical history.   History reviewed. No pertinent surgical history.   No Known Allergies   Social History     Tobacco Use     Smoking status: Not on file     Smokeless tobacco: Not on file   Substance Use Topics     Alcohol use: Not on file      Wt Readings from Last 1 Encounters:   22 63.5 kg (140 lb)        Anesthesia Evaluation   Pt has had prior anesthetic.     No history of anesthetic complications       ROS/MED HX  ENT/Pulmonary:  - neg pulmonary ROS     Neurologic:  - neg neurologic ROS     Cardiovascular:  - neg cardiovascular ROS     METS/Exercise Tolerance:     Hematologic:     (+) anemia,     Musculoskeletal:       GI/Hepatic: Comment: melena      Renal/Genitourinary:  - neg Renal ROS     Endo:  - neg endo ROS     Psychiatric/Substance Use:       Infectious Disease:       Malignancy:       Other:            Physical Exam    Airway        Mallampati: I   TM distance: > 3 FB   Neck ROM: full   Mouth opening: > 3 cm    Respiratory Devices and Support         Dental  no notable dental history         Cardiovascular          Rhythm and rate: regular and normal     Pulmonary           breath sounds clear to auscultation           OUTSIDE LABS:  CBC:   Lab Results   Component Value Date    WBC 9.3 2022    WBC 11.0 2022    HGB 10.2 (L) 2022    HGB 9.1 (L) 2022    HCT 21.2 (L) 2022    HCT 29.4 (L) 2022     2022     2022     BMP:   Lab Results   Component Value Date     2022     2022    POTASSIUM 3.9 2022    POTASSIUM 4.0 2022    CHLORIDE 109 (H) 2022    CHLORIDE 109 2022    CO2 23 2022    CO2 21 2022    BUN 16 2022    BUN 22 2022    CR 0.68 (L) 2022    CR  0.49 (L) 07/07/2022    GLC 81 07/08/2022    GLC 89 07/07/2022     COAGS:   Lab Results   Component Value Date    INR 1.00 07/07/2022     POC: No results found for: BGM, HCG, HCGS  HEPATIC:   Lab Results   Component Value Date    ALBUMIN 3.2 (L) 07/08/2022    PROTTOTAL 5.7 (L) 07/08/2022    ALT 13 07/08/2022    AST 14 07/08/2022    ALKPHOS 34 (L) 07/08/2022    BILITOTAL 0.1 07/08/2022     OTHER:   Lab Results   Component Value Date    CHILO 8.0 (L) 07/08/2022       Anesthesia Plan    ASA Status:  2   NPO Status:  NPO Appropriate    Anesthesia Type: MAC.              Consents    Anesthesia Plan(s) and associated risks, benefits, and realistic alternatives discussed. Questions answered and patient/representative(s) expressed understanding.     - Discussed: Risks, Benefits and Alternatives for the PROCEDURE were discussed     - Discussed with:  Patient         Postoperative Care    Pain management: IV analgesics, Oral pain medications.   PONV prophylaxis: Ondansetron (or other 5HT-3), Dexamethasone or Solumedrol     Comments:                Lazaro Aden MD

## 2022-07-09 NOTE — ANESTHESIA POSTPROCEDURE EVALUATION
Patient: Oh Townsend    Procedure: Procedure(s):  ESOPHAGOGASTRODUODENOSCOPY (EGD) with biopsies       Anesthesia Type:  MAC    Note:     Postop Pain Control: Uneventful            Sign Out: Well controlled pain   PONV: No   Neuro/Psych: Uneventful            Sign Out: Acceptable/Baseline neuro status   Airway/Respiratory: Uneventful            Sign Out: Acceptable/Baseline resp. status   CV/Hemodynamics: Uneventful            Sign Out: Acceptable CV status; No obvious hypovolemia; No obvious fluid overload   Other NRE: NONE   DID A NON-ROUTINE EVENT OCCUR? No           Last vitals:  Vitals:    07/09/22 1550 07/09/22 1600 07/09/22 1601   BP: (!) 76/48 96/51    Pulse: 82 82 88   Resp: 15 16 20   Temp: 36.9  C (98.5  F)     SpO2: 100% 100% 100%       Electronically Signed By: Lazaro Aden MD  July 9, 2022  5:26 PM

## 2022-07-09 NOTE — PROGRESS NOTES
A&Ox4. Up SBA. Patient states lightheadedness has improved. Denies pain. 2 unit(s) PRBC infused per orders. IV protonix and 1/2 NS infusing per orders. BP soft, last was 92/51. NPO. Tele NSR. Urinal voiding. Pleasant and cooperative. Hourly rounding completed, continuing to monitor.    Blood pressure 92/51, pulse 69, temperature 98.6  F (37  C), temperature source Oral, resp. rate 13, weight 63.5 kg (140 lb), SpO2 99 %.    Alexia Hatch RN

## 2022-07-09 NOTE — ANESTHESIA CARE TRANSFER NOTE
Patient: Oh Townsend    Procedure: Procedure(s):  ESOPHAGOGASTRODUODENOSCOPY (EGD) with biopsies       Diagnosis: Melena [K92.1]  Diagnosis Additional Information: No value filed.    Anesthesia Type:   No value filed.     Note:    Oropharynx: oropharynx clear of all foreign objects and spontaneously breathing  Level of Consciousness: awake  Oxygen Supplementation: room air    Independent Airway: airway patency satisfactory and stable  Dentition: dentition unchanged  Vital Signs Stable: post-procedure vital signs reviewed and stable  Report to RN Given: handoff report given  Patient transferred to: Emergency Department    Handoff Report: Identifed the Patient, Identified the Reponsible Provider, Reviewed the pertinent medical history, Discussed the surgical course, Reviewed Intra-OP anesthesia mangement and issues during anesthesia, Set expectations for post-procedure period and Allowed opportunity for questions and acknowledgement of understanding      Vitals:  Vitals Value Taken Time   BP 95/58 07/09/22 1317   Temp 37  C (98.6  F) 07/09/22 1317   Pulse 80 07/09/22 1317   Resp 16 07/09/22 1317   SpO2 100 % 07/09/22 1317       Electronically Signed By: GIUSEPPE Rachel CRNA  July 9, 2022  1:18 PM

## 2022-07-10 VITALS
DIASTOLIC BLOOD PRESSURE: 60 MMHG | OXYGEN SATURATION: 100 % | TEMPERATURE: 98.1 F | BODY MASS INDEX: 22.23 KG/M2 | SYSTOLIC BLOOD PRESSURE: 105 MMHG | HEART RATE: 88 BPM | RESPIRATION RATE: 16 BRPM | WEIGHT: 138.3 LBS

## 2022-07-10 LAB
BASOPHILS # BLD AUTO: 0 10E3/UL (ref 0–0.2)
BASOPHILS NFR BLD AUTO: 0 %
EOSINOPHIL # BLD AUTO: 0.5 10E3/UL (ref 0–0.7)
EOSINOPHIL NFR BLD AUTO: 5 %
ERYTHROCYTE [DISTWIDTH] IN BLOOD BY AUTOMATED COUNT: 14.3 % (ref 10–15)
HCT VFR BLD AUTO: 29.9 % (ref 40–53)
HGB BLD-MCNC: 9.8 G/DL (ref 13.3–17.7)
IMM GRANULOCYTES # BLD: 0 10E3/UL
IMM GRANULOCYTES NFR BLD: 0 %
LYMPHOCYTES # BLD AUTO: 1.2 10E3/UL (ref 0.8–5.3)
LYMPHOCYTES NFR BLD AUTO: 11 %
MCH RBC QN AUTO: 27.8 PG (ref 26.5–33)
MCHC RBC AUTO-ENTMCNC: 32.8 G/DL (ref 31.5–36.5)
MCV RBC AUTO: 85 FL (ref 78–100)
MONOCYTES # BLD AUTO: 0.9 10E3/UL (ref 0–1.3)
MONOCYTES NFR BLD AUTO: 8 %
NEUTROPHILS # BLD AUTO: 8 10E3/UL (ref 1.6–8.3)
NEUTROPHILS NFR BLD AUTO: 76 %
NRBC # BLD AUTO: 0 10E3/UL
NRBC BLD AUTO-RTO: 0 /100
PLATELET # BLD AUTO: 182 10E3/UL (ref 150–450)
RBC # BLD AUTO: 3.52 10E6/UL (ref 4.4–5.9)
WBC # BLD AUTO: 10.6 10E3/UL (ref 4–11)

## 2022-07-10 PROCEDURE — 85025 COMPLETE CBC W/AUTO DIFF WBC: CPT | Performed by: INTERNAL MEDICINE

## 2022-07-10 PROCEDURE — 250N000013 HC RX MED GY IP 250 OP 250 PS 637: Performed by: INTERNAL MEDICINE

## 2022-07-10 PROCEDURE — 96376 TX/PRO/DX INJ SAME DRUG ADON: CPT

## 2022-07-10 PROCEDURE — 36415 COLL VENOUS BLD VENIPUNCTURE: CPT | Performed by: INTERNAL MEDICINE

## 2022-07-10 PROCEDURE — G0378 HOSPITAL OBSERVATION PER HR: HCPCS

## 2022-07-10 RX ORDER — PANTOPRAZOLE SODIUM 40 MG/1
40 TABLET, DELAYED RELEASE ORAL 2 TIMES DAILY
Qty: 60 TABLET | Refills: 1 | Status: SHIPPED | OUTPATIENT
Start: 2022-07-10

## 2022-07-10 RX ADMIN — PANTOPRAZOLE SODIUM 40 MG: 20 TABLET, DELAYED RELEASE ORAL at 08:43

## 2022-07-10 NOTE — PLAN OF CARE
Problem: Plan of Care - These are the overarching goals to be used throughout the patient stay.    Goal: Optimal Comfort and Wellbeing  Outcome: Ongoing, Progressing   Goal Outcome Evaluation:      Pt. Will ret tonight with minimal rest and  sleep interruptions.

## 2022-07-10 NOTE — TREATMENT PLAN
Pt. Transferred up to floor care in PT gym on 3rd floor report called to Yung NUÑEZ . Pt. Transferred in stable condition.

## 2022-07-10 NOTE — PLAN OF CARE
Problem: Plan of Care - These are the overarching goals to be used throughout the patient stay.    Goal: Optimal Comfort and Wellbeing  Outcome: Ongoing, Progressing     Problem: Plan of Care - These are the overarching goals to be used throughout the patient stay.    Goal: Absence of Hospital-Acquired Illness or Injury  Intervention: Identify and Manage Fall Risk  Recent Flowsheet Documentation  Taken 7/9/2022 2030 by Yung García, RN  Safety Promotion/Fall Prevention:   assistive device/personal items within reach   lighting adjusted   nonskid shoes/slippers when out of bed   patient and family education   Goal Outcome Evaluation: Pt arrived to the PT gym from the ED, report received from Yenni NUÑEZ. Pt is A/Ox4, denies any pain. IV protonix infusing @ 10 ml/hr. Pt belongings and call light within reach. Will continue to monitor.

## 2022-07-10 NOTE — PROGRESS NOTES
Pt discharged to home. IV removed. Went over AVS. Educated on protonix medication instructions. All personal belongings sent with pt.

## 2022-07-11 ENCOUNTER — PATIENT OUTREACH (OUTPATIENT)
Dept: CARE COORDINATION | Facility: CLINIC | Age: 27
End: 2022-07-11

## 2022-07-11 DIAGNOSIS — Z71.89 OTHER SPECIFIED COUNSELING: ICD-10-CM

## 2022-07-11 NOTE — PROGRESS NOTES
"Clinic Care Coordination Contact  [facility]: Post-Discharge Note  SITUATION                                                      Admission:    Admission Date: 07/08/22   Reason for Admission: Melena/GI bleed upper  Discharge:   Discharge Date: 07/10/22  Discharge Diagnosis: GI bleed, Melena    BACKGROUND                                                      Per hospital discharge summary and inpatient provider notes:    Oh Townsend is a 26 year old male into the [facility] for a second er visit in 24 hours.  Pt is otherwise  Healthy. He endorses issues with melena over the last 5 days.  He has some epigastric  Burning at times.  Denies fevers, chills, weight loss or bright red blood per rectum.   As stated above yesterday he was seen and had a hemoglobin of 9.  Pt has persistent symptoms.      ASSESSMENT      Enrollment  Primary Care Care Coordination Status: Not a Candidate    Discharge Assessment  How are you doing now that you are home?: \"I feel good, a little tired when I move a lot\"  How are your symptoms? (Red Flag symptoms escalate to triage hotline per guidelines): Improved  Do you feel your condition is stable enough to be safe at home until your provider visit?: Yes  Does the patient have their discharge instructions? : Yes  Does the patient have questions regarding their discharge instructions? : No  Were you started on any new medications or were there changes to any of your previous medications? : Yes  Does the patient have all of their medications?: Yes  Do you have questions regarding any of your medications? : No  Do you have all of your needed medical supplies or equipment (DME)?  (i.e. oxygen tank, CPAP, cane, etc.): Yes  Discharge follow-up appointment scheduled within 14 calendar days? : No  Is patient agreeable to assistance with scheduling? : No (Patient confirmed that he has the phone number for his clinic to call to follow up, CHW reccomended telling  it is for hospital follow up " appointment when he calls to get in sooner at [facility])    Post-op ([facility]W CTA Only)  If the patient had a surgery or procedure, do they have any questions for a nurse?: No    PLAN                                                      Outpatient Plan:       Follow up with primary care provider, Physician No Ref-Primary, within 7   days to evaluate medication change and to evaluate treatment change.  The   following labs/tests are recommended: CBC  Follow up with Dr. David Bains with [facility] and their Phone number [phone] within 2 weeks      No future appointments.      For any urgent concerns, please contact our 24 hour nurse triage line: [phone] ([phone])       Berta Torrez  Community Health Worker  Connected Care Resource [facility]  Ph: [phone]

## 2022-07-20 LAB
PATH REPORT.COMMENTS IMP SPEC: NORMAL
PATH REPORT.COMMENTS IMP SPEC: NORMAL
PATH REPORT.FINAL DX SPEC: NORMAL
PATH REPORT.GROSS SPEC: NORMAL
PATH REPORT.MICROSCOPIC SPEC OTHER STN: NORMAL
PATH REPORT.RELEVANT HX SPEC: NORMAL
PHOTO IMAGE: NORMAL

## 2022-10-03 ENCOUNTER — HEALTH MAINTENANCE LETTER (OUTPATIENT)
Age: 27
End: 2022-10-03

## 2023-10-22 ENCOUNTER — HEALTH MAINTENANCE LETTER (OUTPATIENT)
Age: 28
End: 2023-10-22

## 2024-12-15 ENCOUNTER — HEALTH MAINTENANCE LETTER (OUTPATIENT)
Age: 29
End: 2024-12-15

## 2025-08-06 ENCOUNTER — HOSPITAL ENCOUNTER (EMERGENCY)
Facility: CLINIC | Age: 30
Discharge: HOME | End: 2025-08-06
Attending: EMERGENCY MEDICINE | Admitting: EMERGENCY MEDICINE
Payer: COMMERCIAL

## 2025-08-06 VITALS
DIASTOLIC BLOOD PRESSURE: 62 MMHG | BODY MASS INDEX: 21.59 KG/M2 | SYSTOLIC BLOOD PRESSURE: 104 MMHG | HEIGHT: 65 IN | OXYGEN SATURATION: 100 % | TEMPERATURE: 97.4 F | RESPIRATION RATE: 18 BRPM | WEIGHT: 129.6 LBS | HEART RATE: 72 BPM

## 2025-08-06 DIAGNOSIS — R10.10 UPPER ABDOMINAL PAIN: Primary | ICD-10-CM

## 2025-08-06 DIAGNOSIS — R19.5 DARK STOOLS: ICD-10-CM

## 2025-08-06 LAB
ALBUMIN SERPL BCG-MCNC: 4 G/DL (ref 3.5–5.2)
ALP SERPL-CCNC: 43 U/L (ref 40–150)
ALT SERPL W P-5'-P-CCNC: 12 U/L (ref 0–70)
ANION GAP SERPL CALCULATED.3IONS-SCNC: 8 MMOL/L (ref 7–15)
AST SERPL W P-5'-P-CCNC: 15 U/L (ref 0–45)
BASOPHILS # BLD AUTO: 0 10E3/UL (ref 0–0.2)
BASOPHILS NFR BLD AUTO: 0 %
BILIRUB SERPL-MCNC: 0.3 MG/DL
BUN SERPL-MCNC: 24.3 MG/DL (ref 6–20)
CALCIUM SERPL-MCNC: 8.7 MG/DL (ref 8.8–10.4)
CHLORIDE SERPL-SCNC: 108 MMOL/L (ref 98–107)
CREAT SERPL-MCNC: 0.67 MG/DL (ref 0.67–1.17)
CRP SERPL-MCNC: <3 MG/L
EGFRCR SERPLBLD CKD-EPI 2021: >90 ML/MIN/1.73M2
EOSINOPHIL # BLD AUTO: 0.4 10E3/UL (ref 0–0.7)
EOSINOPHIL NFR BLD AUTO: 5 %
ERYTHROCYTE [DISTWIDTH] IN BLOOD BY AUTOMATED COUNT: 12.9 % (ref 10–15)
GLUCOSE SERPL-MCNC: 106 MG/DL (ref 70–99)
HCO3 SERPL-SCNC: 24 MMOL/L (ref 22–29)
HCT VFR BLD AUTO: 41.8 % (ref 40–53)
HGB BLD-MCNC: 13.8 G/DL (ref 13.3–17.7)
HOLD SPECIMEN: NORMAL
IMM GRANULOCYTES # BLD: 0 10E3/UL
IMM GRANULOCYTES NFR BLD: 0 %
INR PPP: 1.02 (ref 0.85–1.15)
LIPASE SERPL-CCNC: 40 U/L (ref 13–60)
LYMPHOCYTES # BLD AUTO: 2.1 10E3/UL (ref 0.8–5.3)
LYMPHOCYTES NFR BLD AUTO: 31 %
MCH RBC QN AUTO: 29.1 PG (ref 26.5–33)
MCHC RBC AUTO-ENTMCNC: 33 G/DL (ref 31.5–36.5)
MCV RBC AUTO: 88 FL (ref 78–100)
MONOCYTES # BLD AUTO: 0.6 10E3/UL (ref 0–1.3)
MONOCYTES NFR BLD AUTO: 9 %
NEUTROPHILS # BLD AUTO: 3.8 10E3/UL (ref 1.6–8.3)
NEUTROPHILS NFR BLD AUTO: 55 %
NRBC # BLD AUTO: 0 10E3/UL
NRBC BLD AUTO-RTO: 0 /100
PLATELET # BLD AUTO: 183 10E3/UL (ref 150–450)
POTASSIUM SERPL-SCNC: 4 MMOL/L (ref 3.4–5.3)
PROT SERPL-MCNC: 6.4 G/DL (ref 6.4–8.3)
PROTHROMBIN TIME: 13.6 SECONDS (ref 11.8–14.8)
RBC # BLD AUTO: 4.75 10E6/UL (ref 4.4–5.9)
SODIUM SERPL-SCNC: 140 MMOL/L (ref 135–145)
WBC # BLD AUTO: 6.9 10E3/UL (ref 4–11)

## 2025-08-06 PROCEDURE — 85610 PROTHROMBIN TIME: CPT | Performed by: EMERGENCY MEDICINE

## 2025-08-06 PROCEDURE — 250N000011 HC RX IP 250 OP 636: Performed by: EMERGENCY MEDICINE

## 2025-08-06 PROCEDURE — 96374 THER/PROPH/DIAG INJ IV PUSH: CPT | Mod: 59

## 2025-08-06 PROCEDURE — 84155 ASSAY OF PROTEIN SERUM: CPT | Performed by: EMERGENCY MEDICINE

## 2025-08-06 PROCEDURE — 83690 ASSAY OF LIPASE: CPT | Performed by: EMERGENCY MEDICINE

## 2025-08-06 PROCEDURE — 36415 COLL VENOUS BLD VENIPUNCTURE: CPT | Performed by: EMERGENCY MEDICINE

## 2025-08-06 PROCEDURE — 99285 EMERGENCY DEPT VISIT HI MDM: CPT | Mod: 25 | Performed by: EMERGENCY MEDICINE

## 2025-08-06 PROCEDURE — 85004 AUTOMATED DIFF WBC COUNT: CPT | Performed by: EMERGENCY MEDICINE

## 2025-08-06 PROCEDURE — 86140 C-REACTIVE PROTEIN: CPT | Performed by: EMERGENCY MEDICINE

## 2025-08-06 PROCEDURE — 96361 HYDRATE IV INFUSION ADD-ON: CPT

## 2025-08-06 PROCEDURE — 258N000003 HC RX IP 258 OP 636: Performed by: EMERGENCY MEDICINE

## 2025-08-06 RX ORDER — IOPAMIDOL 755 MG/ML
90 INJECTION, SOLUTION INTRAVASCULAR ONCE
Status: COMPLETED | OUTPATIENT
Start: 2025-08-06 | End: 2025-08-06

## 2025-08-06 RX ORDER — PANTOPRAZOLE SODIUM 40 MG/1
40 TABLET, DELAYED RELEASE ORAL DAILY
Qty: 30 TABLET | Refills: 0 | Status: SHIPPED | OUTPATIENT
Start: 2025-08-06 | End: 2025-09-05

## 2025-08-06 RX ADMIN — PANTOPRAZOLE SODIUM 40 MG: 40 INJECTION, POWDER, LYOPHILIZED, FOR SOLUTION INTRAVENOUS at 05:37

## 2025-08-06 RX ADMIN — IOPAMIDOL 90 ML: 755 INJECTION, SOLUTION INTRAVENOUS at 06:11

## 2025-08-06 RX ADMIN — SODIUM CHLORIDE 1000 ML: 0.9 INJECTION, SOLUTION INTRAVENOUS at 05:35

## 2025-08-06 ASSESSMENT — ACTIVITIES OF DAILY LIVING (ADL)
ADLS_ACUITY_SCORE: 51
ADLS_ACUITY_SCORE: 51

## 2025-08-06 ASSESSMENT — COLUMBIA-SUICIDE SEVERITY RATING SCALE - C-SSRS
1. IN THE PAST MONTH, HAVE YOU WISHED YOU WERE DEAD OR WISHED YOU COULD GO TO SLEEP AND NOT WAKE UP?: NO
6. HAVE YOU EVER DONE ANYTHING, STARTED TO DO ANYTHING, OR PREPARED TO DO ANYTHING TO END YOUR LIFE?: NO
2. HAVE YOU ACTUALLY HAD ANY THOUGHTS OF KILLING YOURSELF IN THE PAST MONTH?: NO

## 2025-08-12 ENCOUNTER — OFFICE VISIT (OUTPATIENT)
Dept: FAMILY MEDICINE | Facility: CLINIC | Age: 30
End: 2025-08-12
Attending: EMERGENCY MEDICINE
Payer: COMMERCIAL

## 2025-08-12 VITALS
HEIGHT: 65 IN | DIASTOLIC BLOOD PRESSURE: 60 MMHG | SYSTOLIC BLOOD PRESSURE: 90 MMHG | TEMPERATURE: 98.6 F | WEIGHT: 130.6 LBS | HEART RATE: 79 BPM | RESPIRATION RATE: 13 BRPM | OXYGEN SATURATION: 98 % | BODY MASS INDEX: 21.76 KG/M2

## 2025-08-12 DIAGNOSIS — Z11.3 SCREEN FOR STD (SEXUALLY TRANSMITTED DISEASE): ICD-10-CM

## 2025-08-12 DIAGNOSIS — R10.10 UPPER ABDOMINAL PAIN: Primary | ICD-10-CM

## 2025-08-12 DIAGNOSIS — R19.5 DARK STOOLS: ICD-10-CM

## 2025-08-12 PROBLEM — A63.0 ANAL CONDYLOMA: Status: RESOLVED | Noted: 2022-04-08 | Resolved: 2025-08-12

## 2025-08-12 PROBLEM — Z29.81 ENCOUNTER FOR PRE-EXPOSURE PROPHYLAXIS FOR HIV: Status: RESOLVED | Noted: 2024-01-30 | Resolved: 2025-08-12

## 2025-08-12 LAB
HCV AB SERPL QL IA: NONREACTIVE
HIV 1+2 AB+HIV1 P24 AG SERPL QL IA: NONREACTIVE
T PALLIDUM AB SER QL: NONREACTIVE

## 2025-08-12 PROCEDURE — 87389 HIV-1 AG W/HIV-1&-2 AB AG IA: CPT | Performed by: NURSE PRACTITIONER

## 2025-08-12 PROCEDURE — 3074F SYST BP LT 130 MM HG: CPT | Performed by: NURSE PRACTITIONER

## 2025-08-12 PROCEDURE — 36415 COLL VENOUS BLD VENIPUNCTURE: CPT | Performed by: NURSE PRACTITIONER

## 2025-08-12 PROCEDURE — 87491 CHLMYD TRACH DNA AMP PROBE: CPT | Performed by: NURSE PRACTITIONER

## 2025-08-12 PROCEDURE — 87591 N.GONORRHOEAE DNA AMP PROB: CPT | Performed by: NURSE PRACTITIONER

## 2025-08-12 PROCEDURE — 99203 OFFICE O/P NEW LOW 30 MIN: CPT | Performed by: NURSE PRACTITIONER

## 2025-08-12 PROCEDURE — 86803 HEPATITIS C AB TEST: CPT | Performed by: NURSE PRACTITIONER

## 2025-08-12 PROCEDURE — 3078F DIAST BP <80 MM HG: CPT | Performed by: NURSE PRACTITIONER

## 2025-08-12 PROCEDURE — 86780 TREPONEMA PALLIDUM: CPT | Performed by: NURSE PRACTITIONER

## 2025-08-12 RX ORDER — EMTRICITABINE AND TENOFOVIR DISOPROXIL FUMARATE 200; 300 MG/1; MG/1
1 TABLET, FILM COATED ORAL DAILY
COMMUNITY
Start: 2024-01-30 | End: 2025-08-12 | Stop reason: ALTCHOICE

## 2025-08-13 LAB
C TRACH DNA SPEC QL PROBE+SIG AMP: NEGATIVE
N GONORRHOEA DNA SPEC QL NAA+PROBE: NEGATIVE
SPECIMEN TYPE: NORMAL

## 2025-08-21 ENCOUNTER — DOCUMENTATION ONLY (OUTPATIENT)
Dept: GASTROENTEROLOGY | Facility: CLINIC | Age: 30
End: 2025-08-21
Payer: COMMERCIAL

## 2025-08-25 ENCOUNTER — PATIENT OUTREACH (OUTPATIENT)
Dept: CARE COORDINATION | Facility: CLINIC | Age: 30
End: 2025-08-25
Payer: COMMERCIAL

## 2025-08-27 ENCOUNTER — PATIENT OUTREACH (OUTPATIENT)
Dept: CARE COORDINATION | Facility: CLINIC | Age: 30
End: 2025-08-27
Payer: COMMERCIAL

## (undated) DEVICE — SOL WATER IRRIG 1000ML BOTTLE 2F7114

## (undated) DEVICE — SUCTION MANIFOLD NEPTUNE 2 SYS 1 PORT 702-025-000

## (undated) DEVICE — FORCEP BIOPSY DISP 000386

## (undated) DEVICE — TUBING SUCTION MEDI-VAC 1/4"X20' N620A - HE